# Patient Record
Sex: MALE | Race: BLACK OR AFRICAN AMERICAN | NOT HISPANIC OR LATINO | Employment: FULL TIME | URBAN - METROPOLITAN AREA
[De-identification: names, ages, dates, MRNs, and addresses within clinical notes are randomized per-mention and may not be internally consistent; named-entity substitution may affect disease eponyms.]

---

## 2023-10-24 ENCOUNTER — LAB VISIT (OUTPATIENT)
Dept: LAB | Facility: HOSPITAL | Age: 48
End: 2023-10-24
Attending: PSYCHIATRY & NEUROLOGY
Payer: OTHER GOVERNMENT

## 2023-10-24 ENCOUNTER — OFFICE VISIT (OUTPATIENT)
Dept: NEUROLOGY | Facility: CLINIC | Age: 48
End: 2023-10-24
Payer: OTHER GOVERNMENT

## 2023-10-24 VITALS
SYSTOLIC BLOOD PRESSURE: 126 MMHG | HEIGHT: 72 IN | DIASTOLIC BLOOD PRESSURE: 84 MMHG | WEIGHT: 295.44 LBS | HEART RATE: 80 BPM | BODY MASS INDEX: 40.02 KG/M2

## 2023-10-24 DIAGNOSIS — R73.03 PREDIABETES: ICD-10-CM

## 2023-10-24 DIAGNOSIS — G43.701 CHRONIC MIGRAINE W/O AURA, NOT INTRACTABLE, W STAT MIGR: ICD-10-CM

## 2023-10-24 PROCEDURE — 84252 ASSAY OF VITAMIN B-2: CPT | Performed by: PSYCHIATRY & NEUROLOGY

## 2023-10-24 PROCEDURE — 84207 ASSAY OF VITAMIN B-6: CPT | Performed by: PSYCHIATRY & NEUROLOGY

## 2023-10-24 PROCEDURE — 99203 OFFICE O/P NEW LOW 30 MIN: CPT | Mod: PBBFAC | Performed by: PSYCHIATRY & NEUROLOGY

## 2023-10-24 PROCEDURE — 84443 ASSAY THYROID STIM HORMONE: CPT | Performed by: PSYCHIATRY & NEUROLOGY

## 2023-10-24 PROCEDURE — 99205 PR OFFICE/OUTPT VISIT, NEW, LEVL V, 60-74 MIN: ICD-10-PCS | Mod: S$PBB,,, | Performed by: PSYCHIATRY & NEUROLOGY

## 2023-10-24 PROCEDURE — 36415 COLL VENOUS BLD VENIPUNCTURE: CPT | Performed by: PSYCHIATRY & NEUROLOGY

## 2023-10-24 PROCEDURE — 99999 PR PBB SHADOW E&M-NEW PATIENT-LVL III: ICD-10-PCS | Mod: PBBFAC,,, | Performed by: PSYCHIATRY & NEUROLOGY

## 2023-10-24 PROCEDURE — 82607 VITAMIN B-12: CPT | Performed by: PSYCHIATRY & NEUROLOGY

## 2023-10-24 PROCEDURE — 84425 ASSAY OF VITAMIN B-1: CPT | Performed by: PSYCHIATRY & NEUROLOGY

## 2023-10-24 PROCEDURE — 99999 PR PBB SHADOW E&M-NEW PATIENT-LVL III: CPT | Mod: PBBFAC,,, | Performed by: PSYCHIATRY & NEUROLOGY

## 2023-10-24 PROCEDURE — 82306 VITAMIN D 25 HYDROXY: CPT | Performed by: PSYCHIATRY & NEUROLOGY

## 2023-10-24 PROCEDURE — 99205 OFFICE O/P NEW HI 60 MIN: CPT | Mod: S$PBB,,, | Performed by: PSYCHIATRY & NEUROLOGY

## 2023-10-24 RX ORDER — TRAZODONE HYDROCHLORIDE 100 MG/1
100 TABLET ORAL
COMMUNITY
Start: 2023-05-12

## 2023-10-24 RX ORDER — METFORMIN HYDROCHLORIDE 1000 MG/1
1000 TABLET ORAL
COMMUNITY
Start: 2023-01-06

## 2023-10-24 RX ORDER — RIZATRIPTAN BENZOATE 10 MG/1
10 TABLET, ORALLY DISINTEGRATING ORAL 2 TIMES DAILY PRN
Qty: 9 TABLET | Refills: 12 | Status: SHIPPED | OUTPATIENT
Start: 2023-10-24

## 2023-10-24 RX ORDER — PROMETHAZINE HYDROCHLORIDE AND DEXTROMETHORPHAN HYDROBROMIDE 6.25; 15 MG/5ML; MG/5ML
SYRUP ORAL
COMMUNITY
Start: 2023-08-25

## 2023-10-24 RX ORDER — TOPIRAMATE 25 MG/1
25 TABLET ORAL 2 TIMES DAILY
Qty: 60 TABLET | Refills: 11 | Status: SHIPPED | OUTPATIENT
Start: 2023-10-24 | End: 2024-10-23

## 2023-10-24 RX ORDER — CETIRIZINE HYDROCHLORIDE 10 MG/1
10 TABLET ORAL NIGHTLY PRN
COMMUNITY
Start: 2023-08-25

## 2023-10-24 RX ORDER — ATORVASTATIN CALCIUM 40 MG/1
20 TABLET, FILM COATED ORAL
COMMUNITY
Start: 2023-03-24

## 2023-10-24 RX ORDER — ONDANSETRON 8 MG/1
8 TABLET, ORALLY DISINTEGRATING ORAL EVERY 6 HOURS PRN
Qty: 20 TABLET | Refills: 12 | Status: SHIPPED | OUTPATIENT
Start: 2023-10-24

## 2023-10-24 NOTE — PROGRESS NOTES
Subjective     Patient ID: Chris Bravo is a 47 y.o. male.    Chief Complaint: Headache    HPI  Self referral   Headache history:   Age of onset -  46  Location - bifrontal   Nature of pain -  pounding   Prodrome - no   Aura -  no   Duration of headache - > 4 hrs   Time to peak intensity - hrs   Pain scale - range of intensity -  8-10/10  Frequency -  6 days/week   Status Migrainosus history - no   Time of day of most headaches- anytime     Associated symptoms with the headache:   Meningeal symptoms - photophobia, phonophobia, exercise intolerance  +   Nausea/vomitting +   Nasal drainage   Visual blurriness   Pallor/flushing  Dizziness   Vertigo  Confusion  Impaired concentration +   Pain worsened with physical activity +   Neck pain     Cluster headache symptoms: none   Symptoms of increased intracranial pressure: none   Basilar migraine symptoms:none     Headache Triggers: unknown     Other comorbid conditions:  Anxiety - no   Motion sickness symptom - no     Treatment history:  Resolution of headache with sleep - yes   Meds tried:  BC - not help     Medication List with Changes/Refills   New Medications    ONDANSETRON (ZOFRAN-ODT) 8 MG TBDL    Take 1 tablet (8 mg total) by mouth every 6 (six) hours as needed (nausea).       Start Date: 10/24/2023End Date: --    RIZATRIPTAN (MAXALT-MLT) 10 MG DISINTEGRATING TABLET    Take 1 tablet (10 mg total) by mouth 2 (two) times daily as needed for Migraine. May repeat in 2 hours if needed       Start Date: 10/24/2023End Date: --    TOPIRAMATE (TOPAMAX) 25 MG TABLET    Take 1 tablet (25 mg total) by mouth 2 (two) times daily.       Start Date: 10/24/2023End Date: 10/23/2024   Current Medications    ATORVASTATIN (LIPITOR) 40 MG TABLET    20 mg.       Start Date: 3/24/2023 End Date: --    CETIRIZINE (ZYRTEC) 10 MG TABLET    Take 10 mg by mouth nightly as needed.       Start Date: 8/25/2023 End Date: --    METFORMIN (GLUCOPHAGE) 1000 MG TABLET    1,000 mg.       Start Date:  1/6/2023  End Date: --    PROMETHAZINE-DEXTROMETHORPHAN (PROMETHAZINE-DM) 6.25-15 MG/5 ML SYRP    SMARTSIG:10 Milliliter(s) By Mouth Every 8 Hours PRN       Start Date: 8/25/2023 End Date: --    TRAZODONE (DESYREL) 100 MG TABLET    100 mg.       Start Date: 5/12/2023 End Date: --     Depression x 1.5 yrs     Headache risk factors:   H/o TBI  - no   H/o Meningitis  - no   F/h Aneurysms  - no    Review of Systems   Constitutional: Negative.    HENT: Negative.     Eyes: Negative.    Respiratory: Negative.     Cardiovascular: Negative.    Gastrointestinal: Negative.    Endocrine: Negative.    Genitourinary: Negative.    Musculoskeletal:  Positive for neck stiffness.   Integumentary:  Negative.   Allergic/Immunologic: Negative.    Neurological:  Positive for headaches.   Hematological: Negative.    Psychiatric/Behavioral: Negative.            Objective     Physical Exam  Constitutional:       Appearance: Normal appearance.   HENT:      Head: Normocephalic and atraumatic.      Right Ear: External ear normal.      Left Ear: External ear normal.      Nose: Nose normal.      Mouth/Throat:      Mouth: Mucous membranes are moist.   Eyes:      Pupils: Pupils are equal, round, and reactive to light.   Cardiovascular:      Rate and Rhythm: Normal rate.      Pulses: Normal pulses.   Pulmonary:      Effort: Pulmonary effort is normal.   Musculoskeletal:         General: Tenderness present.      Comments: Limited ROM neck   Skin:     General: Skin is warm.   Neurological:      General: No focal deficit present.      Mental Status: He is alert and oriented to person, place, and time.      Cranial Nerves: No cranial nerve deficit.      Motor: No weakness.      Gait: Gait normal.   Psychiatric:         Mood and Affect: Mood normal.         Behavior: Behavior normal.         Thought Content: Thought content normal.         Judgment: Judgment normal.         Headache Exam:  Temples appear symmetric  No V1,V2,V3 distribution  allodynia/hyperalgesia newton   No facial swelling  No gross TMJ abnormalities   No ptosis   No conj injection   No meningismus   No neck stiffness  + C spine tenderness   Cervical facet tenderness on palpation newton   No tender points over trapezium   No supraorbital nerve exit point tenderness  Newton occipital nerve exit point tenderness  No auriculotemporal nerve tenderness        Assessment and Plan     1. Chronic migraine w/o aura, not intractable, w stat migr  -     rizatriptan (MAXALT-MLT) 10 MG disintegrating tablet; Take 1 tablet (10 mg total) by mouth 2 (two) times daily as needed for Migraine. May repeat in 2 hours if needed  Dispense: 9 tablet; Refill: 12  -     ondansetron (ZOFRAN-ODT) 8 MG TbDL; Take 1 tablet (8 mg total) by mouth every 6 (six) hours as needed (nausea).  Dispense: 20 tablet; Refill: 12  -     topiramate (TOPAMAX) 25 MG tablet; Take 1 tablet (25 mg total) by mouth 2 (two) times daily.  Dispense: 60 tablet; Refill: 11  -     Vitamin B1; Future; Expected date: 10/24/2023  -     Vitamin B12; Future; Expected date: 10/24/2023  -     Vitamin B2; Future; Expected date: 10/24/2023  -     Vitamin B6; Future; Expected date: 10/24/2023  -     Vitamin D; Future; Expected date: 10/24/2023  -     TSH; Future; Expected date: 10/24/2023    2. Prediabetes        1. Given long standing history of headaches with no change in character and no associated neurological symptoms, no reportable neurological symptoms in between episodes, and normal neuro exam today there is no indication for an MRI.   2. Will check vitamin B1, B2, B6, B12, Vitamin D,TSH  3. Given prescription for prophylactic medication - Topamax. I discussed side effects of the medications. We will start at a lower dose. I asked him  to stop the medication if he notices serious adverse effects like psychosis and seek immediate medical attention at an ER.   Breakthrough headache - Maxalt, zofran   Multiple alternative treatment options were offered to  the patient  4. Patient education. Discussed prevention and treatment of migraine headaches. Discussed sleep hygiene, healthy diet, importance of minimizing caffeine intake to a maximum of 100-200 mg /day, importance of avoiding foods with MSG, Nutrasweet, and Aspartame.   Provided hand outs on this.   5. Refrain from over counter pain medications. Discussed medication overuse headache.  6. Occipital neuralgia - If medical management is ineffective may consider occipital nerve blocks.   7. I urged the patient to keep a headache calender.     Consider PT for neck     Patient verbalized understanding to plan. I answered all his  questions to his  satisfaction.    No follow-ups on file.

## 2023-10-25 LAB
25(OH)D3+25(OH)D2 SERPL-MCNC: 25 NG/ML (ref 30–96)
TSH SERPL DL<=0.005 MIU/L-ACNC: 1.3 UIU/ML (ref 0.4–4)
VIT B12 SERPL-MCNC: 737 PG/ML (ref 210–950)

## 2023-10-28 LAB — VIT B2 SERPL-MCNC: 3 MCG/L (ref 1–19)

## 2023-10-30 LAB
PYRIDOXAL SERPL-MCNC: 28 UG/L (ref 5–50)
VIT B1 BLD-MCNC: 58 UG/L (ref 38–122)